# Patient Record
Sex: FEMALE | Race: WHITE | NOT HISPANIC OR LATINO | Employment: PART TIME | URBAN - METROPOLITAN AREA
[De-identification: names, ages, dates, MRNs, and addresses within clinical notes are randomized per-mention and may not be internally consistent; named-entity substitution may affect disease eponyms.]

---

## 2018-01-05 ENCOUNTER — GENERIC CONVERSION - ENCOUNTER (OUTPATIENT)
Dept: OTHER | Facility: OTHER | Age: 23
End: 2018-01-05

## 2018-01-05 DIAGNOSIS — F41.8 OTHER SPECIFIED ANXIETY DISORDERS: ICD-10-CM

## 2018-01-25 ENCOUNTER — TRANSCRIBE ORDERS (OUTPATIENT)
Dept: LAB | Facility: CLINIC | Age: 23
End: 2018-01-25

## 2018-01-25 ENCOUNTER — APPOINTMENT (OUTPATIENT)
Dept: LAB | Facility: CLINIC | Age: 23
End: 2018-01-25
Payer: COMMERCIAL

## 2018-01-25 DIAGNOSIS — F41.8 OTHER SPECIFIED ANXIETY DISORDERS: ICD-10-CM

## 2018-01-25 LAB — TSH SERPL DL<=0.05 MIU/L-ACNC: 2.23 UIU/ML (ref 0.36–3.74)

## 2018-01-25 PROCEDURE — 36415 COLL VENOUS BLD VENIPUNCTURE: CPT

## 2018-01-25 PROCEDURE — 84443 ASSAY THYROID STIM HORMONE: CPT

## 2018-01-26 RX ORDER — BUPROPION HYDROCHLORIDE 300 MG/1
1 TABLET ORAL DAILY
COMMUNITY
End: 2018-03-09 | Stop reason: SDUPTHER

## 2018-01-26 RX ORDER — FLUOXETINE HYDROCHLORIDE 20 MG/1
1 CAPSULE ORAL DAILY
COMMUNITY
Start: 2018-01-05 | End: 2018-02-06 | Stop reason: SDUPTHER

## 2018-02-06 ENCOUNTER — OFFICE VISIT (OUTPATIENT)
Dept: FAMILY MEDICINE CLINIC | Facility: CLINIC | Age: 23
End: 2018-02-06
Payer: COMMERCIAL

## 2018-02-06 VITALS
BODY MASS INDEX: 28.71 KG/M2 | SYSTOLIC BLOOD PRESSURE: 110 MMHG | HEIGHT: 64 IN | WEIGHT: 168.2 LBS | OXYGEN SATURATION: 98 % | HEART RATE: 88 BPM | DIASTOLIC BLOOD PRESSURE: 72 MMHG | TEMPERATURE: 97.6 F

## 2018-02-06 DIAGNOSIS — F41.8 ANXIETY WITH DEPRESSION: Primary | ICD-10-CM

## 2018-02-06 DIAGNOSIS — N94.3 PMS (PREMENSTRUAL SYNDROME): ICD-10-CM

## 2018-02-06 PROCEDURE — 99213 OFFICE O/P EST LOW 20 MIN: CPT | Performed by: FAMILY MEDICINE

## 2018-02-06 RX ORDER — FLUOXETINE HYDROCHLORIDE 20 MG/1
CAPSULE ORAL
Qty: 60 CAPSULE | Refills: 1 | Status: SHIPPED | OUTPATIENT
Start: 2018-02-06 | End: 2018-04-20 | Stop reason: SDUPTHER

## 2018-02-06 NOTE — PROGRESS NOTES
Assessment/Plan:     Chronic Problems:  PMS (premenstrual syndrome)   Will pulse dose the fluoxetine during your premenstrual   Take 1 pill daily and mid cycle when you are getting more anxious increase to 2 pills daily and take until the 2nd day of your menses  Anxiety with depression    Will increase the fluoxetine as discussed premenstrually  Stay on your Wellbutrin at the same dose  Visit Diagnosis:  Diagnoses and all orders for this visit:    Anxiety with depression  -     FLUoxetine (PROzac) 20 mg capsule; Take 1 pill daily but pulse dose 2 pills prior to your premenstrual period and continue until day 2 of your menses  May continue on 2 pills if you find this helps your anxiety on a daily basis  PMS (premenstrual syndrome)  -     FLUoxetine (PROzac) 20 mg capsule; Take 1 pill daily but pulse dose 2 pills prior to your premenstrual period and continue until day 2 of your menses  May continue on 2 pills if you find this helps your anxiety on a daily basis  Other orders  -     MELATONIN PO; Take 5 mg PE by mouth          Subjective:    Patient ID: Vinod Dennison is a 25 y o  male  Pt is here for f/u on her fluoxetine  Feels this has helped the symptoms associated with her anxiety  Still with anxiety but better  Sometimes it is worse when she has her menses  Sometimes has circular thoughts with anxious thoughts, but no somatic problems now  Questions her ability to do things, but not stopping her from doing things  Takes all other meds as directed  No side effects noted  The following portions of the patient's history were reviewed and updated as appropriate: allergies, current medications, past family history, past medical history, past social history, past surgical history and problem list     Review of Systems   Constitutional: Negative for chills and fever  HENT: Negative for ear pain, postnasal drip and sore throat  Eyes: Negative for pain and visual disturbance  Respiratory: Negative for cough, chest tightness, shortness of breath ( able to control her shortness of breath with her anxiety ) and wheezing  Cardiovascular: Negative for chest pain (FeelsHer racing heart is better on the fluoxetine ), palpitations and leg swelling  Gastrointestinal: Negative for abdominal pain, constipation, diarrhea, nausea and vomiting  Endocrine: Negative for cold intolerance, heat intolerance and polyuria  Genitourinary: Negative for dysuria, frequency and urgency  [facility] January [facility]th  Musculoskeletal: Negative for arthralgias, gait problem and myalgias  Skin: Negative for pallor and rash  Neurological: Negative for dizziness, tremors, light-headedness and numbness  Psychiatric/Behavioral: Negative for dysphoric mood ( feels she does not noticeAny depressive symptoms  She is more conscious of her anxiety ) and suicidal ideas  The patient is not nervous/anxious  Started taking her fluoxetine at night so she does not forget to take it           /72   Pulse 88   Temp 97 6 °F (36 4 °C)   Ht 5' 4" (1 626 m)   Wt 76 3 kg (168 lb 3 2 oz)   SpO2 98%   BMI 28 87 kg/m²   Social History     Social History    Marital status: Single     Spouse name: N/A    Number of children: N/A    Years of education: N/A     Occupational History    EMPLOYED       Social History Main Topics    Smoking status: Never Smoker    Smokeless tobacco: Never Used      Comment: NO SECONDHAND EXPOSURE     Alcohol use Yes      Comment: OCCASIONALLY     Drug use: No    Sexual activity: Not on file     Other Topics Concern    Not on file     Social History Narrative    ALWAYS USES [employer]    [employer] USE     STUDENT    LIVES WITH PARENTS         Past Medical History:   Diagnosis Date    Depression     Sinus problem      Family History   Problem Relation Age of Onset    Cancer Mother     Other Mother      MENTAL DISORDER     Thyroid disease Mother     Seizures Father EPILEPSY    Cancer Father     Other Father      MENTAL DISORDER     Other Maternal Grandfather      MENTAL DISORDER     Other Other      CARDIAC DISORDER    Diabetes Other     Hypertension Other     Hyperlipidemia Other     Stroke Other     Thyroid disease Other     Hyperlipidemia Other     Cancer Other     Other Paternal Aunt      MENTAL DISORDER      Past Surgical History:   Procedure Laterality Date    TONSILLECTOMY AND ADENOIDECTOMY         Current Outpatient Prescriptions:     buPROPion (WELLBUTRIN XL) 300 mg 24 hr tablet, Take 1 tablet by mouth daily, Disp: , Rfl:     Cetirizine HCl (ZYRTEC ALLERGY) 10 MG CAPS, Take by mouth, Disp: , Rfl:     FLUoxetine (PROzac) 20 mg capsule, Take 1 pill daily but pulse dose 2 pills prior to your premenstrual period and continue until day 2 of your menses  May continue on 2 pills if you find this helps your anxiety on a daily basis  , Disp: 60 capsule, Rfl: 1    MELATONIN PO, Take 5 mg PE by mouth, Disp: , Rfl:       Objective:     Physical Exam   Constitutional: He is oriented to person, place, and time  He appears well-developed and well-nourished  HENT:   Head: Normocephalic  Right Ear: External ear normal    Left Ear: External ear normal    Eyes: Pupils are equal, round, and reactive to light  Right eye exhibits no discharge  Cardiovascular: Normal rate, regular rhythm and normal heart sounds  Pulmonary/Chest: No respiratory distress  He has no wheezes  He has no rales  Musculoskeletal: He exhibits no edema or tenderness  Lymphadenopathy:     He has no cervical adenopathy  Neurological: He is alert and oriented to person, place, and time  Skin: Skin is warm and dry  Psychiatric: He has a normal mood and affect  Good eye contact  Appears happy  There are no Patient Instructions on file for this visit      Follow up here in 2 months    [address] [place], [place] [address]MALINDA

## 2018-02-06 NOTE — PATIENT INSTRUCTIONS
Stay on the Wellbutrin as prior  Remain on 20 mg of fluoxetine for now but premenstrual E when you are getting anxious and developing symptoms take 2 until day 2 of your period  If you find that this is helped your anxiety we can keep you on 40 mg daily  Continue your other medications

## 2018-02-06 NOTE — ASSESSMENT & PLAN NOTE
Will pulse dose the fluoxetine during your premenstrual   Take 1 pill daily and mid cycle when you are getting more anxious increase to 2 pills daily and take until the 2nd day of your menses

## 2018-02-26 VITALS
SYSTOLIC BLOOD PRESSURE: 115 MMHG | HEART RATE: 99 BPM | HEIGHT: 64 IN | TEMPERATURE: 98.8 F | OXYGEN SATURATION: 99 % | BODY MASS INDEX: 28.94 KG/M2 | DIASTOLIC BLOOD PRESSURE: 72 MMHG | RESPIRATION RATE: 17 BRPM | WEIGHT: 169.5 LBS

## 2018-03-09 ENCOUNTER — TELEPHONE (OUTPATIENT)
Dept: FAMILY MEDICINE CLINIC | Facility: CLINIC | Age: 23
End: 2018-03-09

## 2018-03-09 DIAGNOSIS — F34.1 DYSTHYMIA: Primary | ICD-10-CM

## 2018-03-09 DIAGNOSIS — F41.9 ANXIETY AND DEPRESSION: Primary | ICD-10-CM

## 2018-03-09 DIAGNOSIS — F32.A ANXIETY AND DEPRESSION: Primary | ICD-10-CM

## 2018-03-09 RX ORDER — BUPROPION HYDROCHLORIDE 300 MG/1
300 TABLET ORAL DAILY
Qty: 30 TABLET | Refills: 3 | Status: SHIPPED | OUTPATIENT
Start: 2018-03-09 | End: 2018-07-12 | Stop reason: SDUPTHER

## 2018-03-09 NOTE — TELEPHONE ENCOUNTER
I called it in, but it was recorded in the emr as the generic bupropion  You may want to let her know

## 2018-04-20 DIAGNOSIS — N94.3 PMS (PREMENSTRUAL SYNDROME): ICD-10-CM

## 2018-04-20 DIAGNOSIS — F41.8 ANXIETY WITH DEPRESSION: ICD-10-CM

## 2018-04-20 NOTE — TELEPHONE ENCOUNTER
Pt called in today stating that mom was dx with ebv and she would like to have labs done to see if she has it because she is going to [place] on sunday and would like to know if she is positive before you go   She is feeling fatigued but she states that she frequently experiences it she also states that she has constantly been having headaches lately

## 2018-04-21 RX ORDER — FLUOXETINE HYDROCHLORIDE 20 MG/1
CAPSULE ORAL
Qty: 60 CAPSULE | Refills: 3 | Status: SHIPPED | OUTPATIENT
Start: 2018-04-21 | End: 2018-04-23 | Stop reason: SDUPTHER

## 2018-04-23 DIAGNOSIS — F41.8 ANXIETY WITH DEPRESSION: ICD-10-CM

## 2018-04-23 DIAGNOSIS — N94.3 PMS (PREMENSTRUAL SYNDROME): ICD-10-CM

## 2018-04-23 RX ORDER — FLUOXETINE HYDROCHLORIDE 20 MG/1
CAPSULE ORAL
Qty: 60 CAPSULE | Refills: 0 | Status: SHIPPED | OUTPATIENT
Start: 2018-04-23 | End: 2018-04-24 | Stop reason: SDUPTHER

## 2018-04-24 DIAGNOSIS — F41.8 ANXIETY WITH DEPRESSION: ICD-10-CM

## 2018-04-24 DIAGNOSIS — N94.3 PMS (PREMENSTRUAL SYNDROME): ICD-10-CM

## 2018-04-29 RX ORDER — FLUOXETINE HYDROCHLORIDE 20 MG/1
CAPSULE ORAL
Qty: 60 CAPSULE | Refills: 0 | Status: SHIPPED | OUTPATIENT
Start: 2018-04-29 | End: 2018-05-22 | Stop reason: SDUPTHER

## 2018-05-02 ENCOUNTER — APPOINTMENT (OUTPATIENT)
Dept: LAB | Facility: CLINIC | Age: 23
End: 2018-05-02
Payer: COMMERCIAL

## 2018-05-02 ENCOUNTER — OFFICE VISIT (OUTPATIENT)
Dept: FAMILY MEDICINE CLINIC | Facility: CLINIC | Age: 23
End: 2018-05-02
Payer: COMMERCIAL

## 2018-05-02 VITALS
HEART RATE: 82 BPM | HEIGHT: 64 IN | WEIGHT: 178 LBS | OXYGEN SATURATION: 98 % | DIASTOLIC BLOOD PRESSURE: 62 MMHG | BODY MASS INDEX: 30.39 KG/M2 | TEMPERATURE: 98.1 F | SYSTOLIC BLOOD PRESSURE: 110 MMHG

## 2018-05-02 DIAGNOSIS — R53.83 OTHER FATIGUE: ICD-10-CM

## 2018-05-02 DIAGNOSIS — R53.83 OTHER FATIGUE: Primary | ICD-10-CM

## 2018-05-02 PROCEDURE — 86665 EPSTEIN-BARR CAPSID VCA: CPT

## 2018-05-02 PROCEDURE — 86308 HETEROPHILE ANTIBODY SCREEN: CPT

## 2018-05-02 PROCEDURE — 86663 EPSTEIN-BARR ANTIBODY: CPT

## 2018-05-02 PROCEDURE — 86664 EPSTEIN-BARR NUCLEAR ANTIGEN: CPT

## 2018-05-02 PROCEDURE — 99213 OFFICE O/P EST LOW 20 MIN: CPT | Performed by: FAMILY MEDICINE

## 2018-05-02 PROCEDURE — 85025 COMPLETE CBC W/AUTO DIFF WBC: CPT

## 2018-05-02 PROCEDURE — 36415 COLL VENOUS BLD VENIPUNCTURE: CPT

## 2018-05-02 NOTE — PROGRESS NOTES
Assessment/Plan:    Fatigue  Exposure to Viki-Mathias in family   Discussed plan of care will obtain lab work to evaluate          Subjective:      Patient ID: Ac De Paz is a 25 y o  female  Would like to be tested for [employer], just because parents have  it  Has been tired , with headaches , has been on going   Slight st , neg rash , lesion , abd pain  Neg hx of thyroid   Parents recently dx with viki barr          The following portions of the patient's history were reviewed and updated as appropriate:   She has a past medical history of Depression and Sinus problem  ,   does not have any pertinent problems on file  ,   has a past surgical history that includes Tonsillectomy and adenoidectomy  ,  family history includes Cancer in her father, mother, and other; Diabetes in her other; Hyperlipidemia in her other and other; Hypertension in her other; Other in her father, maternal grandfather, mother, other, and paternal aunt; Seizures in her father; Stroke in her other; Thyroid disease in her mother and other  ,   reports that she has never smoked  She has never used smokeless tobacco  She reports that she drinks alcohol  She reports that she does not use drugs  ,  is allergic to augmentin [amoxicillin-pot clavulanate]         Review of Systems   Constitutional: Positive for fatigue  Negative for appetite change, chills, fever and unexpected weight change  HENT: Negative for congestion, dental problem, ear pain, hearing loss, postnasal drip, rhinorrhea, sinus pain, sinus pressure, sneezing, sore throat, tinnitus and voice change  Eyes: Negative for visual disturbance  Respiratory: Negative for apnea, cough, chest tightness and shortness of breath  Cardiovascular: Negative for chest pain, palpitations and leg swelling  Gastrointestinal: Negative for abdominal pain, blood in stool, constipation, diarrhea, nausea and vomiting     Endocrine: Negative for cold intolerance, heat intolerance, polydipsia, polyphagia and polyuria  Genitourinary: Negative for decreased urine volume, difficulty urinating, dysuria, frequency and hematuria  Musculoskeletal: Negative for arthralgias, back pain, gait problem, joint swelling and myalgias  Skin: Negative for color change, rash and wound  Allergic/Immunologic: Negative for environmental allergies and food allergies  Neurological: Negative for dizziness, syncope, weakness, light-headedness, numbness and headaches  Hematological: Negative for adenopathy  Does not bruise/bleed easily  Psychiatric/Behavioral: Negative for sleep disturbance and suicidal ideas  The patient is not nervous/anxious  Objective:  Vitals:    05/02/18 1603   BP: 110/62   Pulse: 82   Temp: 98 1 °F (36 7 °C)   SpO2: 98%      Physical Exam   Constitutional: She is oriented to person, place, and time  She appears well-developed and well-nourished  HENT:   Head: Normocephalic and atraumatic  Right Ear: External ear normal    Left Ear: External ear normal    Mouth/Throat: Oropharynx is clear and moist  No oropharyngeal exudate  Eyes: EOM are normal  No scleral icterus  Neck: Normal range of motion  Neck supple  No thyromegaly present  Cardiovascular: Normal rate, regular rhythm and normal heart sounds  Pulmonary/Chest: Effort normal and breath sounds normal    Musculoskeletal: Normal range of motion  Lymphadenopathy:     She has no cervical adenopathy  Neurological: She is alert and oriented to person, place, and time  Skin: Skin is warm and dry  Psychiatric: She has a normal mood and affect   Her behavior is normal  Judgment and thought content normal

## 2018-05-03 LAB
BASOPHILS # BLD AUTO: 0.02 THOUSANDS/ΜL (ref 0–0.1)
BASOPHILS NFR BLD AUTO: 0 % (ref 0–1)
EOSINOPHIL # BLD AUTO: 0.09 THOUSAND/ΜL (ref 0–0.61)
EOSINOPHIL NFR BLD AUTO: 1 % (ref 0–6)
ERYTHROCYTE [DISTWIDTH] IN BLOOD BY AUTOMATED COUNT: 13.4 % (ref 11.6–15.1)
HCT VFR BLD AUTO: 40.7 % (ref 34.8–46.1)
HETEROPH AB SER QL: NEGATIVE
HGB BLD-MCNC: 13.1 G/DL (ref 11.5–15.4)
LYMPHOCYTES # BLD AUTO: 1.96 THOUSANDS/ΜL (ref 0.6–4.47)
LYMPHOCYTES NFR BLD AUTO: 29 % (ref 14–44)
MCH RBC QN AUTO: 28.2 PG (ref 26.8–34.3)
MCHC RBC AUTO-ENTMCNC: 32.2 G/DL (ref 31.4–37.4)
MCV RBC AUTO: 88 FL (ref 82–98)
MONOCYTES # BLD AUTO: 0.55 THOUSAND/ΜL (ref 0.17–1.22)
MONOCYTES NFR BLD AUTO: 8 % (ref 4–12)
NEUTROPHILS # BLD AUTO: 4.18 THOUSANDS/ΜL (ref 1.85–7.62)
NEUTS SEG NFR BLD AUTO: 62 % (ref 43–75)
NRBC BLD AUTO-RTO: 0 /100 WBCS
PLATELET # BLD AUTO: 262 THOUSANDS/UL (ref 149–390)
PMV BLD AUTO: 11.4 FL (ref 8.9–12.7)
RBC # BLD AUTO: 4.65 MILLION/UL (ref 3.81–5.12)
WBC # BLD AUTO: 6.81 THOUSAND/UL (ref 4.31–10.16)

## 2018-05-04 ENCOUNTER — TELEPHONE (OUTPATIENT)
Dept: FAMILY MEDICINE CLINIC | Facility: CLINIC | Age: 23
End: 2018-05-04

## 2018-05-04 LAB
EBV EA IGG SER-ACNC: 22.3 U/ML (ref 0–8.9)
EBV NA IGG SER IA-ACNC: 66 U/ML (ref 0–17.9)
EBV PATRN SPEC IB-IMP: ABNORMAL
EBV VCA IGG SER IA-ACNC: 158 U/ML (ref 0–17.9)
EBV VCA IGM SER IA-ACNC: <36 U/ML (ref 0–35.9)

## 2018-05-09 DIAGNOSIS — F41.8 ANXIETY WITH DEPRESSION: ICD-10-CM

## 2018-05-09 DIAGNOSIS — N94.3 PMS (PREMENSTRUAL SYNDROME): ICD-10-CM

## 2018-05-09 RX ORDER — FLUOXETINE HYDROCHLORIDE 20 MG/1
CAPSULE ORAL
Qty: 60 CAPSULE | Refills: 0 | Status: CANCELLED | OUTPATIENT
Start: 2018-05-09

## 2018-05-22 DIAGNOSIS — F41.8 ANXIETY WITH DEPRESSION: ICD-10-CM

## 2018-05-22 DIAGNOSIS — N94.3 PMS (PREMENSTRUAL SYNDROME): ICD-10-CM

## 2018-05-22 RX ORDER — FLUOXETINE HYDROCHLORIDE 20 MG/1
CAPSULE ORAL
Qty: 60 CAPSULE | Refills: 0 | Status: SHIPPED | OUTPATIENT
Start: 2018-05-22 | End: 2018-05-24 | Stop reason: SDUPTHER

## 2018-05-24 ENCOUNTER — OFFICE VISIT (OUTPATIENT)
Dept: FAMILY MEDICINE CLINIC | Facility: CLINIC | Age: 23
End: 2018-05-24
Payer: COMMERCIAL

## 2018-05-24 VITALS
OXYGEN SATURATION: 98 % | HEART RATE: 104 BPM | TEMPERATURE: 96.8 F | BODY MASS INDEX: 29.53 KG/M2 | SYSTOLIC BLOOD PRESSURE: 110 MMHG | DIASTOLIC BLOOD PRESSURE: 70 MMHG | WEIGHT: 173 LBS | HEIGHT: 64 IN

## 2018-05-24 DIAGNOSIS — F41.9 ANXIETY: Primary | ICD-10-CM

## 2018-05-24 PROBLEM — F41.8 ANXIETY WITH DEPRESSION: Status: RESOLVED | Noted: 2018-02-06 | Resolved: 2018-05-24

## 2018-05-24 PROCEDURE — 99213 OFFICE O/P EST LOW 20 MIN: CPT | Performed by: FAMILY MEDICINE

## 2018-05-24 PROCEDURE — 1036F TOBACCO NON-USER: CPT | Performed by: FAMILY MEDICINE

## 2018-05-24 PROCEDURE — 3008F BODY MASS INDEX DOCD: CPT | Performed by: FAMILY MEDICINE

## 2018-05-24 RX ORDER — FLUOXETINE HYDROCHLORIDE 40 MG/1
40 CAPSULE ORAL DAILY
Qty: 30 CAPSULE | Refills: 3 | Status: SHIPPED | OUTPATIENT
Start: 2018-05-24 | End: 2018-05-31 | Stop reason: SDUPTHER

## 2018-05-24 NOTE — PROGRESS NOTES
Assessment/Plan:     Chronic Problems:  No problem-specific Assessment & Plan notes found for this encounter  Visit Diagnosis:  Diagnoses and all orders for this visit:    Anxiety  Comments: Will switch back to fluoxetine 40 mg capsules  Advised to follow up here in 6 weeks if still with anxiety  Orders:  -     FLUoxetine (PROzac) 40 MG capsule; Take 1 capsule (40 mg total) by mouth daily          Subjective:    Patient ID: Nabil Lindo is a 25 y o  female  Pt is here with c/o having increased anxiety since she was given fluoxetine pills instead of capsules  No different situational problems, but she feels this is from the pills  Feels she is having generalized anxiety  Feels she was much better with her generalized anxiety on capsules  Currently on 20 mg daily but was doing much better on 40 mg daily  The following portions of the patient's history were reviewed and updated as appropriate: allergies, current medications, past family history, past medical history, past social history, past surgical history and problem list     Review of Systems   Constitutional: Negative for chills, fatigue (unless she is doing a lot of work at her job  ) and fever  HENT: Negative for ear pain, postnasal drip and sore throat  Eyes: Negative for pain and visual disturbance  Respiratory: Negative for cough, chest tightness, shortness of breath and wheezing  Cardiovascular: Negative for chest pain, palpitations and leg swelling  Gastrointestinal: Negative for abdominal pain, constipation, diarrhea, nausea and vomiting  Endocrine: Negative for cold intolerance, heat intolerance and polyuria  Genitourinary: Negative for dysuria, frequency and urgency  Still gets some pms but during the rest of the month on 40 mg it is non existant  Musculoskeletal: Negative for arthralgias, gait problem and myalgias  Skin: Negative for pallor and rash     Neurological: Negative for dizziness, tremors, light-headedness and numbness  Psychiatric/Behavioral: Negative for dysphoric mood and suicidal ideas  The patient is nervous/anxious  Knows the difference between situational and general anxiety            /70   Pulse 104   Temp (!) 96 8 °F (36 °C)   Ht 5' 4" (1 626 m)   Wt 78 5 kg (173 lb)   SpO2 98%   BMI 29 70 kg/m²   Social History     Social History    Marital status: Single     Spouse name: N/A    Number of children: N/A    Years of education: N/A     Occupational History    EMPLOYED       Social History Main Topics    Smoking status: Never Smoker    Smokeless tobacco: Never Used      Comment: NO SECONDHAND EXPOSURE     Alcohol use Yes      Comment: OCCASIONALLY     Drug use: No    Sexual activity: Not on file     Other Topics Concern    Not on file     Social History Narrative    ALWAYS USES [employer]    [employer] USE     STUDENT    LIVES WITH PARENTS         Past Medical History:   Diagnosis Date    Depression     Sinus problem      Family History   Problem Relation Age of Onset    Cancer Mother     Other Mother      MENTAL DISORDER     Thyroid disease Mother     Seizures Father      EPILEPSY    Cancer Father     Other Father      MENTAL DISORDER     Other Maternal Grandfather      MENTAL DISORDER     Other Other      CARDIAC DISORDER    Diabetes Other     Hypertension Other     Hyperlipidemia Other     Stroke Other     Thyroid disease Other     Hyperlipidemia Other     Cancer Other     Other Paternal Aunt      MENTAL DISORDER      Past Surgical History:   Procedure Laterality Date    TONSILLECTOMY AND ADENOIDECTOMY         Current Outpatient Prescriptions:     buPROPion (WELLBUTRIN XL) 300 mg 24 hr tablet, Take 1 tablet (300 mg total) by mouth daily, Disp: 30 tablet, Rfl: 3    Cetirizine HCl (ZYRTEC ALLERGY) 10 MG CAPS, Take by mouth, Disp: , Rfl:     MELATONIN PO, Take 5 mg PE by mouth daily  , Disp: , Rfl:     FLUoxetine (PROzac) 40 MG capsule, Take 1 capsule (40 mg total) by mouth daily, Disp: 30 capsule, Rfl: 3    Allergies   Allergen Reactions    Augmentin [Amoxicillin-Pot Clavulanate] Nausea Only and Vomiting          Lab Review   Appointment on 05/02/2018   Component Date Value    WBC 05/02/2018 6 81     RBC 05/02/2018 4 65     Hemoglobin 05/02/2018 13 1     Hematocrit 05/02/2018 40 7     MCV 05/02/2018 88     MCH 05/02/2018 28 2     MCHC 05/02/2018 32 2     RDW 05/02/2018 13 4     MPV 05/02/2018 11 4     Platelets 27/43/0291 262     nRBC 05/02/2018 0     Neutrophils Relative 05/02/2018 62     Lymphocytes Relative 05/02/2018 29     Monocytes Relative 05/02/2018 8     Eosinophils Relative 05/02/2018 1     Basophils Relative 05/02/2018 0     Neutrophils Absolute 05/02/2018 4 18     Lymphocytes Absolute 05/02/2018 1 96     Monocytes Absolute 05/02/2018 0 55     Eosinophils Absolute 05/02/2018 0 09     Basophils Absolute 05/02/2018 0 02     Monotest 05/02/2018 Negative     EBV Early Antigen Ab, IgG 05/02/2018 22 3*    EBV VCA IgG 05/02/2018 158 0*    EBV VCA IgM 05/02/2018 <36 0     EBV Nuclear Ag Ab 05/02/2018 66 0*    EBV Interp  05/02/2018 Comment         Imaging: No results found  Objective:     Physical Exam   Constitutional: She is oriented to person, place, and time  HENT:   Head: Normocephalic  Right Ear: External ear normal    Left Ear: External ear normal    Eyes: EOM are normal  Pupils are equal, round, and reactive to light  Right eye exhibits no discharge  Neck: Neck supple  No tracheal deviation present  No thyromegaly present  Cardiovascular: Normal rate and normal heart sounds  No murmur heard  Pulmonary/Chest: No respiratory distress  She has no wheezes  She has no rales  Abdominal: Soft  There is no tenderness  Musculoskeletal: Normal range of motion  Lymphadenopathy:     She has no cervical adenopathy  Neurological: She is alert and oriented to person, place, and time     Skin: Skin is warm and dry    Psychiatric: She has a normal mood and affect  Patient Instructions     Discussed all with patient  Expect her fatigue to be resolving from your recent [facility] infection  Called in 40 mg fluoxetine capsules to be taken once daily  Give it a couple of weeks if the anxiety persists you will need to come in and be re-evaluated for either a med increase or change in meds        MALINDA Ruth

## 2018-05-31 DIAGNOSIS — F41.9 ANXIETY: ICD-10-CM

## 2018-05-31 RX ORDER — FLUOXETINE HYDROCHLORIDE 40 MG/1
40 CAPSULE ORAL DAILY
Qty: 90 CAPSULE | Refills: 1 | Status: SHIPPED | OUTPATIENT
Start: 2018-05-31 | End: 2018-06-25 | Stop reason: ALTCHOICE

## 2018-06-01 ENCOUNTER — TELEPHONE (OUTPATIENT)
Dept: BARIATRICS | Facility: CLINIC | Age: 23
End: 2018-06-01

## 2018-06-15 ENCOUNTER — TELEPHONE (OUTPATIENT)
Dept: FAMILY MEDICINE CLINIC | Facility: CLINIC | Age: 23
End: 2018-06-15

## 2018-06-17 DIAGNOSIS — R53.83 FATIGUE, UNSPECIFIED TYPE: Primary | ICD-10-CM

## 2018-06-23 DIAGNOSIS — F34.1 DYSTHYMIA: Primary | ICD-10-CM

## 2018-06-25 RX ORDER — FLUOXETINE 20 MG/1
TABLET, FILM COATED ORAL
Qty: 60 TABLET | Refills: 0 | Status: SHIPPED | OUTPATIENT
Start: 2018-06-25 | End: 2018-11-26 | Stop reason: SDUPTHER

## 2018-07-12 DIAGNOSIS — F34.1 DYSTHYMIA: ICD-10-CM

## 2018-07-12 RX ORDER — BUPROPION HYDROCHLORIDE 300 MG/1
300 TABLET ORAL DAILY
Qty: 30 TABLET | Refills: 3 | Status: SHIPPED | OUTPATIENT
Start: 2018-07-12 | End: 2018-11-19 | Stop reason: SDUPTHER

## 2018-11-19 DIAGNOSIS — F34.1 DYSTHYMIA: ICD-10-CM

## 2018-11-19 RX ORDER — BUPROPION HYDROCHLORIDE 300 MG/1
300 TABLET ORAL DAILY
Qty: 30 TABLET | Refills: 2 | Status: SHIPPED | OUTPATIENT
Start: 2018-11-19

## 2018-11-26 DIAGNOSIS — F34.1 DYSTHYMIA: ICD-10-CM

## 2018-11-26 RX ORDER — FLUOXETINE 20 MG/1
TABLET, FILM COATED ORAL
Qty: 90 TABLET | Refills: 0 | Status: SHIPPED | OUTPATIENT
Start: 2018-11-26 | End: 2018-11-29 | Stop reason: SDUPTHER

## 2018-11-29 DIAGNOSIS — F34.1 DYSTHYMIA: ICD-10-CM

## 2018-11-29 RX ORDER — FLUOXETINE 20 MG/1
TABLET, FILM COATED ORAL
Qty: 60 TABLET | Refills: 0 | Status: SHIPPED | OUTPATIENT
Start: 2018-11-29 | End: 2018-11-30 | Stop reason: SDUPTHER

## 2018-11-30 DIAGNOSIS — F34.1 DYSTHYMIA: ICD-10-CM

## 2018-11-30 RX ORDER — FLUOXETINE 20 MG/1
TABLET, FILM COATED ORAL
Qty: 180 TABLET | Refills: 0 | Status: SHIPPED | OUTPATIENT
Start: 2018-11-30